# Patient Record
Sex: FEMALE | Race: WHITE | NOT HISPANIC OR LATINO | Employment: OTHER | URBAN - METROPOLITAN AREA
[De-identification: names, ages, dates, MRNs, and addresses within clinical notes are randomized per-mention and may not be internally consistent; named-entity substitution may affect disease eponyms.]

---

## 2017-12-28 ENCOUNTER — HOSPITAL ENCOUNTER (OUTPATIENT)
Dept: OTHER | Facility: HOSPITAL | Age: 77
Discharge: HOME | End: 2017-12-28
Attending: PSYCHIATRY & NEUROLOGY | Admitting: PSYCHIATRY & NEUROLOGY

## 2017-12-28 LAB
ALBUMIN SERPL-MCNC: 4.1 G/DL (ref 3.5–4.8)
ALBUMIN/GLOB SERPL: 1.8 {RATIO} (ref 1–1.7)
ALP SERPL-CCNC: 27 IU/L (ref 32–91)
ALT SERPL-CCNC: 13 IU/L (ref 14–54)
ANION GAP SERPL CALC-SCNC: 9.3 MMOL/L (ref 10–20)
AST SERPL-CCNC: 24 IU/L (ref 15–41)
BASOPHILS # BLD AUTO: 0 10*3/UL (ref 0–0.2)
BASOPHILS NFR BLD AUTO: 1 % (ref 0–2)
BILIRUB SERPL-MCNC: 0.4 MG/DL (ref 0.3–1.2)
BUN SERPL-MCNC: 16 MG/DL (ref 8–20)
BUN/CREAT SERPL: 14.5 (ref 5.4–26.2)
CALCIUM SERPL-MCNC: 9.2 MG/DL (ref 8.9–10.3)
CHLORIDE SERPL-SCNC: 105 MMOL/L (ref 101–111)
CONV CO2: 28 MMOL/L (ref 22–32)
CONV TOTAL PROTEIN: 6.4 G/DL (ref 6.1–7.9)
CREAT UR-MCNC: 1.1 MG/DL (ref 0.4–1)
DIFFERENTIAL METHOD BLD: (no result)
EOSINOPHIL # BLD AUTO: 0.2 10*3/UL (ref 0–0.3)
EOSINOPHIL # BLD AUTO: 5 % (ref 0–3)
ERYTHROCYTE [DISTWIDTH] IN BLOOD BY AUTOMATED COUNT: 13.3 % (ref 11.5–14.5)
ERYTHROCYTE [SEDIMENTATION RATE] IN BLOOD BY WESTERGREN METHOD: 16 MM/HR (ref 0–30)
GLOBULIN UR ELPH-MCNC: 2.3 G/DL (ref 2.5–3.8)
GLUCOSE SERPL-MCNC: 82 MG/DL (ref 65–99)
HCT VFR BLD AUTO: 41.5 % (ref 35–49)
HGB BLD-MCNC: 13.8 G/DL (ref 12–15)
LYMPHOCYTES # BLD AUTO: 1.4 10*3/UL (ref 0.8–4.8)
LYMPHOCYTES NFR BLD AUTO: 27 % (ref 18–42)
MAGNESIUM SERPL-MCNC: 2.2 MG/DL (ref 1.8–2.5)
MCH RBC QN AUTO: 32.5 PG (ref 26–32)
MCHC RBC AUTO-ENTMCNC: 33.2 G/DL (ref 32–36)
MCV RBC AUTO: 97.9 FL (ref 80–94)
MONOCYTES # BLD AUTO: 0.7 10*3/UL (ref 0.1–1.3)
MONOCYTES NFR BLD AUTO: 14 % (ref 2–11)
NEUTROPHILS # BLD AUTO: 2.8 10*3/UL (ref 2.3–8.6)
NEUTROPHILS NFR BLD AUTO: 53 % (ref 50–75)
NRBC BLD AUTO-RTO: 0 /100{WBCS}
NRBC/RBC NFR BLD MANUAL: 0 10*3/UL
PLATELET # BLD AUTO: 399 10*3/UL (ref 150–450)
PMV BLD AUTO: 8.6 FL (ref 7.4–10.4)
POTASSIUM SERPL-SCNC: 4.3 MMOL/L (ref 3.6–5.1)
RBC # BLD AUTO: 4.24 10*6/UL (ref 4–5.4)
SODIUM SERPL-SCNC: 138 MMOL/L (ref 136–144)
WBC # BLD AUTO: 5.1 10*3/UL (ref 4.5–11.5)

## 2017-12-29 LAB
ANA SER QL IA: NORMAL
CHROMATIN AB SERPL-ACNC: <20 [IU]/ML (ref 0–20)

## 2018-01-09 ENCOUNTER — HOSPITAL ENCOUNTER (OUTPATIENT)
Dept: LAB | Facility: HOSPITAL | Age: 78
Discharge: HOME | End: 2018-01-09
Attending: PSYCHIATRY & NEUROLOGY | Admitting: PSYCHIATRY & NEUROLOGY

## 2018-03-06 ENCOUNTER — OFFICE (AMBULATORY)
Dept: URBAN - METROPOLITAN AREA PATHOLOGY 4 | Facility: PATHOLOGY | Age: 78
End: 2018-03-06

## 2018-03-06 ENCOUNTER — HOSPITAL ENCOUNTER (OUTPATIENT)
Dept: OTHER | Facility: HOSPITAL | Age: 78
Setting detail: SPECIMEN
Discharge: HOME | End: 2018-03-06
Attending: INTERNAL MEDICINE | Admitting: INTERNAL MEDICINE

## 2018-03-06 ENCOUNTER — ON CAMPUS - OUTPATIENT (AMBULATORY)
Dept: URBAN - METROPOLITAN AREA HOSPITAL 2 | Facility: HOSPITAL | Age: 78
End: 2018-03-06

## 2018-03-06 VITALS
DIASTOLIC BLOOD PRESSURE: 63 MMHG | RESPIRATION RATE: 15 BRPM | WEIGHT: 127.6 LBS | RESPIRATION RATE: 20 BRPM | TEMPERATURE: 96.8 F | OXYGEN SATURATION: 99 % | HEART RATE: 61 BPM | DIASTOLIC BLOOD PRESSURE: 62 MMHG | SYSTOLIC BLOOD PRESSURE: 136 MMHG | HEIGHT: 65 IN | HEART RATE: 65 BPM | DIASTOLIC BLOOD PRESSURE: 51 MMHG | HEART RATE: 68 BPM | SYSTOLIC BLOOD PRESSURE: 108 MMHG | SYSTOLIC BLOOD PRESSURE: 106 MMHG | DIASTOLIC BLOOD PRESSURE: 56 MMHG | DIASTOLIC BLOOD PRESSURE: 61 MMHG | OXYGEN SATURATION: 98 % | SYSTOLIC BLOOD PRESSURE: 117 MMHG | OXYGEN SATURATION: 95 % | OXYGEN SATURATION: 100 % | RESPIRATION RATE: 16 BRPM | DIASTOLIC BLOOD PRESSURE: 71 MMHG | HEART RATE: 63 BPM | HEART RATE: 62 BPM | HEART RATE: 55 BPM | SYSTOLIC BLOOD PRESSURE: 105 MMHG | DIASTOLIC BLOOD PRESSURE: 47 MMHG | HEART RATE: 57 BPM | SYSTOLIC BLOOD PRESSURE: 134 MMHG | HEART RATE: 60 BPM | SYSTOLIC BLOOD PRESSURE: 137 MMHG | SYSTOLIC BLOOD PRESSURE: 96 MMHG | DIASTOLIC BLOOD PRESSURE: 60 MMHG | RESPIRATION RATE: 18 BRPM | HEART RATE: 56 BPM

## 2018-03-06 DIAGNOSIS — K21.0 GASTRO-ESOPHAGEAL REFLUX DISEASE WITH ESOPHAGITIS: ICD-10-CM

## 2018-03-06 DIAGNOSIS — D12.5 BENIGN NEOPLASM OF SIGMOID COLON: ICD-10-CM

## 2018-03-06 DIAGNOSIS — D13.0 BENIGN NEOPLASM OF ESOPHAGUS: ICD-10-CM

## 2018-03-06 DIAGNOSIS — Z80.0 FAMILY HISTORY OF MALIGNANT NEOPLASM OF DIGESTIVE ORGANS: ICD-10-CM

## 2018-03-06 DIAGNOSIS — R13.10 DYSPHAGIA, UNSPECIFIED: ICD-10-CM

## 2018-03-06 DIAGNOSIS — K57.30 DIVERTICULOSIS OF LARGE INTESTINE WITHOUT PERFORATION OR ABS: ICD-10-CM

## 2018-03-06 LAB
GI HISTOLOGY: A. UNSPECIFIED: (no result)
GI HISTOLOGY: B. SELECT: (no result)
GI HISTOLOGY: C. UNSPECIFIED: (no result)
GI HISTOLOGY: PDF REPORT: (no result)

## 2018-03-06 PROCEDURE — 88305 TISSUE EXAM BY PATHOLOGIST: CPT | Mod: 26 | Performed by: INTERNAL MEDICINE

## 2018-03-06 PROCEDURE — 43239 EGD BIOPSY SINGLE/MULTIPLE: CPT | Mod: 59 | Performed by: INTERNAL MEDICINE

## 2018-03-06 PROCEDURE — 45380 COLONOSCOPY AND BIOPSY: CPT | Performed by: INTERNAL MEDICINE

## 2018-03-06 PROCEDURE — 43450 DILATE ESOPHAGUS 1/MULT PASS: CPT | Performed by: INTERNAL MEDICINE

## 2018-03-06 RX ADMIN — PROPOFOL: 10 INJECTION, EMULSION INTRAVENOUS at 09:05

## 2018-11-13 ENCOUNTER — HOSPITAL ENCOUNTER (OUTPATIENT)
Dept: PHYSICAL THERAPY | Facility: HOSPITAL | Age: 78
Setting detail: RECURRING SERIES
Discharge: HOME | End: 2018-11-29
Attending: NURSE PRACTITIONER | Admitting: NURSE PRACTITIONER

## 2019-05-20 ENCOUNTER — CONVERSION ENCOUNTER (OUTPATIENT)
Dept: OTHER | Facility: HOSPITAL | Age: 79
End: 2019-05-20

## 2019-06-04 VITALS
BODY MASS INDEX: 20.66 KG/M2 | DIASTOLIC BLOOD PRESSURE: 70 MMHG | WEIGHT: 124 LBS | HEIGHT: 65 IN | SYSTOLIC BLOOD PRESSURE: 137 MMHG | HEART RATE: 71 BPM

## 2019-10-09 RX ORDER — LEVETIRACETAM 750 MG/1
1500 TABLET ORAL 2 TIMES DAILY
Qty: 360 TABLET | Refills: 3 | Status: SHIPPED | OUTPATIENT
Start: 2019-10-09 | End: 2020-06-01

## 2020-02-11 RX ORDER — TRAZODONE HYDROCHLORIDE 50 MG/1
TABLET ORAL
Qty: 90 TABLET | Refills: 1 | Status: SHIPPED | OUTPATIENT
Start: 2020-02-11 | End: 2020-05-20 | Stop reason: SDUPTHER

## 2020-05-20 ENCOUNTER — OFFICE VISIT (OUTPATIENT)
Dept: NEUROLOGY | Facility: CLINIC | Age: 80
End: 2020-05-20

## 2020-05-20 VITALS
BODY MASS INDEX: 21.69 KG/M2 | DIASTOLIC BLOOD PRESSURE: 74 MMHG | TEMPERATURE: 97.1 F | SYSTOLIC BLOOD PRESSURE: 119 MMHG | HEART RATE: 66 BPM | WEIGHT: 122.4 LBS | HEIGHT: 63 IN

## 2020-05-20 DIAGNOSIS — G47.01 INSOMNIA DUE TO MEDICAL CONDITION: ICD-10-CM

## 2020-05-20 DIAGNOSIS — R56.9 SEIZURES (HCC): Primary | ICD-10-CM

## 2020-05-20 PROBLEM — T85.43XA RUPTURED SILICONE BREAST IMPLANT: Status: ACTIVE | Noted: 2018-08-09

## 2020-05-20 PROBLEM — Z98.86: Status: ACTIVE | Noted: 2018-08-09

## 2020-05-20 PROBLEM — F32.A DEPRESSION: Status: ACTIVE | Noted: 2017-12-20

## 2020-05-20 PROBLEM — M25.512 CHRONIC LEFT SHOULDER PAIN: Status: ACTIVE | Noted: 2018-11-06

## 2020-05-20 PROBLEM — I10 HYPERTENSION: Status: ACTIVE | Noted: 2019-07-08

## 2020-05-20 PROBLEM — Z90.13 HISTORY OF BILATERAL MASTECTOMY: Status: ACTIVE | Noted: 2018-08-09

## 2020-05-20 PROBLEM — M81.0 OSTEOPOROSIS: Status: ACTIVE | Noted: 2017-12-20

## 2020-05-20 PROBLEM — D47.3 ESSENTIAL THROMBOCYTHEMIA (HCC): Status: ACTIVE | Noted: 2019-07-29

## 2020-05-20 PROBLEM — G89.29 CHRONIC LEFT SHOULDER PAIN: Status: ACTIVE | Noted: 2018-11-06

## 2020-05-20 PROCEDURE — 99213 OFFICE O/P EST LOW 20 MIN: CPT | Performed by: PSYCHIATRY & NEUROLOGY

## 2020-05-20 RX ORDER — PRAVASTATIN SODIUM 80 MG/1
TABLET ORAL
COMMUNITY
Start: 2020-04-28

## 2020-05-20 RX ORDER — ESCITALOPRAM OXALATE 10 MG/1
TABLET ORAL
COMMUNITY
Start: 2020-04-28

## 2020-05-20 RX ORDER — LANOLIN ALCOHOL/MO/W.PET/CERES
CREAM (GRAM) TOPICAL EVERY 24 HOURS
COMMUNITY
Start: 2018-05-24

## 2020-05-20 RX ORDER — HYDROXYUREA 500 MG/1
CAPSULE ORAL
COMMUNITY
Start: 2020-04-10

## 2020-05-20 RX ORDER — TRAZODONE HYDROCHLORIDE 50 MG/1
50 TABLET ORAL
Qty: 90 TABLET | Refills: 3 | Status: SHIPPED | OUTPATIENT
Start: 2020-05-20 | End: 2021-03-31

## 2020-05-20 RX ORDER — PHENOL 1.4 %
AEROSOL, SPRAY (ML) MUCOUS MEMBRANE EVERY 24 HOURS
COMMUNITY
Start: 2017-12-20

## 2020-05-20 RX ORDER — LEVOTHYROXINE SODIUM 0.07 MG/1
TABLET ORAL
COMMUNITY
Start: 2020-04-28

## 2020-05-20 NOTE — PROGRESS NOTES
Subjective: Seizure disorder    Patient ID: Michelle Mcelroy is a 79 y.o. female.    History of Present Illness, yearly f/u    Seizure disorder,   Patient is taking Kepra 750 mg 2 bid,     she is doing well with the medication.had seizures on lower dose.     No seizures and no side effects.    etiology: idiopathic  semiology: movements of hands and shoulders, no complete LOC  onset: age 52, off medicaiton for 10 yers sz free, sz about 4 yrs, no seizurs since back on medication  Medication: Patient is taking Kepra 750 mg 2 bid, she is doing well with the medication.had seizures on lower dose.      Insomnia, sleep onset, doing fair with the trazodone    The following portions of the patient's history were reviewed and updated as appropriate: allergies, current medications, past family history, past medical history, past social history, past surgical history and problem list.    History reviewed. No pertinent family history.    History reviewed. No pertinent past medical history.    Social History     Socioeconomic History   • Marital status: Single     Spouse name: Not on file   • Number of children: Not on file   • Years of education: Not on file   • Highest education level: Not on file   Tobacco Use   • Smoking status: Never Smoker   • Smokeless tobacco: Never Used   Substance and Sexual Activity   • Alcohol use: Never     Frequency: Never   • Drug use: Never   • Sexual activity: Not Currently     Partners: Male         Current Outpatient Medications:   •  aspirin 81 MG tablet, Daily., Disp: , Rfl:   •  calcium carbonate (OS-IGNACIO) 600 MG tablet, Daily., Disp: , Rfl:   •  diclofenac (VOLTAREN) 1 % gel gel, , Disp: , Rfl:   •  escitalopram (LEXAPRO) 10 MG tablet, , Disp: , Rfl:   •  hydroxyurea (HYDREA) 500 MG capsule, , Disp: , Rfl:   •  levETIRAcetam (KEPPRA) 750 MG tablet, Take 2 tablets by mouth 2 (Two) Times a Day., Disp: 360 tablet, Rfl: 3  •  levothyroxine (SYNTHROID, LEVOTHROID) 75 MCG tablet, , Disp: , Rfl:   •   pravastatin (PRAVACHOL) 80 MG tablet, , Disp: , Rfl:   •  traZODone (DESYREL) 50 MG tablet, Take 1 tablet by mouth every night at bedtime., Disp: 90 tablet, Rfl: 3  •  vitamin B-12 (CYANOCOBALAMIN) 1000 MCG tablet, Daily., Disp: , Rfl:     Review of Systems   Constitutional: Negative for appetite change and fatigue.   HENT: Negative for nosebleeds and postnasal drip.    Eyes: Negative for itching and visual disturbance.   Respiratory: Negative for cough and shortness of breath.    Cardiovascular: Negative for chest pain and palpitations.   Gastrointestinal: Positive for diarrhea. Negative for constipation.   Endocrine: Negative for cold intolerance and heat intolerance.   Genitourinary: Negative for difficulty urinating and frequency.   Musculoskeletal: Positive for back pain. Negative for neck pain.   Allergic/Immunologic: Negative for environmental allergies.   Neurological: Positive for seizures. Negative for dizziness, tremors, syncope, facial asymmetry, speech difficulty, weakness, light-headedness, numbness and headaches.   Psychiatric/Behavioral: Negative for agitation and confusion.          I have reviewed ROS completed by medical assistant.     Objective:    Neurologic Exam    Physical Exam   Constitutional: She appears well-developed and well-nourished.   Neck: Normal range of motion.   Psychiatric: She has a normal mood and affect.   Vitals reviewed.      Assessment/Plan:    Michelle was seen today for seizures.    Diagnoses and all orders for this visit:    Seizures (CMS/HCC)    Insomnia due to medical condition    Other orders  -     traZODone (DESYREL) 50 MG tablet; Take 1 tablet by mouth every night at bedtime.        Continue keppra 3000mg and trazodone 50 mg per day    Fu in one year    This document has been electronically signed by Joseph Seipel, MD on May 20, 2020 11:03

## 2020-06-01 RX ORDER — LEVETIRACETAM 750 MG/1
TABLET ORAL
Qty: 360 TABLET | Refills: 3 | Status: SHIPPED | OUTPATIENT
Start: 2020-06-01 | End: 2021-03-18 | Stop reason: SDUPTHER

## 2020-12-18 ENCOUNTER — OFFICE (AMBULATORY)
Dept: URBAN - METROPOLITAN AREA PATHOLOGY 4 | Facility: PATHOLOGY | Age: 80
End: 2020-12-18

## 2020-12-18 ENCOUNTER — ON CAMPUS - OUTPATIENT (AMBULATORY)
Dept: URBAN - METROPOLITAN AREA HOSPITAL 2 | Facility: HOSPITAL | Age: 80
End: 2020-12-18

## 2020-12-18 VITALS
DIASTOLIC BLOOD PRESSURE: 66 MMHG | HEART RATE: 67 BPM | SYSTOLIC BLOOD PRESSURE: 135 MMHG | HEART RATE: 65 BPM | DIASTOLIC BLOOD PRESSURE: 50 MMHG | SYSTOLIC BLOOD PRESSURE: 110 MMHG | DIASTOLIC BLOOD PRESSURE: 41 MMHG | SYSTOLIC BLOOD PRESSURE: 105 MMHG | DIASTOLIC BLOOD PRESSURE: 74 MMHG | SYSTOLIC BLOOD PRESSURE: 134 MMHG | SYSTOLIC BLOOD PRESSURE: 116 MMHG | DIASTOLIC BLOOD PRESSURE: 67 MMHG | HEIGHT: 65 IN | HEART RATE: 64 BPM | OXYGEN SATURATION: 98 % | OXYGEN SATURATION: 100 % | HEART RATE: 61 BPM | SYSTOLIC BLOOD PRESSURE: 133 MMHG | WEIGHT: 119 LBS | HEART RATE: 66 BPM | SYSTOLIC BLOOD PRESSURE: 129 MMHG | RESPIRATION RATE: 18 BRPM | HEART RATE: 55 BPM | DIASTOLIC BLOOD PRESSURE: 47 MMHG | DIASTOLIC BLOOD PRESSURE: 69 MMHG | RESPIRATION RATE: 16 BRPM | HEART RATE: 62 BPM | SYSTOLIC BLOOD PRESSURE: 148 MMHG | HEART RATE: 58 BPM | TEMPERATURE: 97.3 F | DIASTOLIC BLOOD PRESSURE: 55 MMHG | DIASTOLIC BLOOD PRESSURE: 98 MMHG

## 2020-12-18 DIAGNOSIS — R19.7 DIARRHEA, UNSPECIFIED: ICD-10-CM

## 2020-12-18 DIAGNOSIS — D12.3 BENIGN NEOPLASM OF TRANSVERSE COLON: ICD-10-CM

## 2020-12-18 DIAGNOSIS — K57.30 DIVERTICULOSIS OF LARGE INTESTINE WITHOUT PERFORATION OR ABS: ICD-10-CM

## 2020-12-18 DIAGNOSIS — R19.4 CHANGE IN BOWEL HABIT: ICD-10-CM

## 2020-12-18 DIAGNOSIS — R13.10 DYSPHAGIA, UNSPECIFIED: ICD-10-CM

## 2020-12-18 PROBLEM — K63.5 POLYP OF COLON: Status: ACTIVE | Noted: 2020-12-18

## 2020-12-18 LAB
GI HISTOLOGY: A. UNSPECIFIED: (no result)
GI HISTOLOGY: B. UNSPECIFIED: (no result)
GI HISTOLOGY: C. UNSPECIFIED: (no result)
GI HISTOLOGY: PDF REPORT: (no result)

## 2020-12-18 PROCEDURE — 45380 COLONOSCOPY AND BIOPSY: CPT | Performed by: INTERNAL MEDICINE

## 2020-12-18 PROCEDURE — 43239 EGD BIOPSY SINGLE/MULTIPLE: CPT | Performed by: INTERNAL MEDICINE

## 2020-12-18 PROCEDURE — 88305 TISSUE EXAM BY PATHOLOGIST: CPT | Mod: 26 | Performed by: INTERNAL MEDICINE

## 2020-12-18 PROCEDURE — 43450 DILATE ESOPHAGUS 1/MULT PASS: CPT | Performed by: INTERNAL MEDICINE

## 2021-02-25 ENCOUNTER — TELEPHONE (OUTPATIENT)
Dept: NEUROLOGY | Facility: CLINIC | Age: 81
End: 2021-02-25

## 2021-02-25 RX ORDER — TRIAMCINOLONE ACETONIDE 1 MG/G
CREAM TOPICAL
COMMUNITY
Start: 2021-02-23

## 2021-02-25 RX ORDER — ZOLPIDEM TARTRATE 5 MG/1
TABLET ORAL
COMMUNITY
Start: 2021-02-03

## 2021-02-25 NOTE — TELEPHONE ENCOUNTER
Caller: PT  Relationship to Patient: SELF  Phone Number: [phone]  Reason for Call: PT. HAD 2ND COVID SHOT (PFIZER) YESTERDAY; PT SUFFERED SEIZURE TODAY, THE FIRST IN APPROXIMATELY 4 YEARS.    PATIENT ORIGINALLY HAD AN APPOINTMENT SCHEDULED FOR 5/24/2021; I WAS ABLE TO RE-SCHEDULE FOR 4/5/2021.    PLEASE CALL AND ADVISE.

## 2021-02-25 NOTE — TELEPHONE ENCOUNTER
Was this a seizure like reported before with movents of UE and shoulders but no complete LOC?    continue taking the Keppra the same dose

## 2021-03-15 ENCOUNTER — TELEPHONE (OUTPATIENT)
Dept: NEUROLOGY | Facility: CLINIC | Age: 81
End: 2021-03-15

## 2021-03-15 NOTE — TELEPHONE ENCOUNTER
Provider: DR SEIPEL    Caller: ELISA SO    Relationship to Patient: SELF    Pharmacy: [facility]     Phone Number: [phone]    Reason for Call: THE PT STATED SHE HAD A MAJOR SEIZURE ON Saturday AND SHE JUST WANTED TO LET DR SEIPEL KNOW. SHE STATED SHE IS FEELING ALRIGHT RIGHT NOW. THE PT STATED SHE WAS JUST TIRED AFTER THE SEIZURE BUT HAD NO OTHER SIDE EFFECTS.    When was the patient last seen: 5/20/20    PLEASE GIVE HER A CALL TO DISCUSS THIS WITH HER.

## 2021-03-18 RX ORDER — LEVETIRACETAM 750 MG/1
TABLET ORAL
Qty: 450 TABLET | Refills: 3 | Status: SHIPPED | OUTPATIENT
Start: 2021-03-18 | End: 2021-04-05 | Stop reason: SDUPTHER

## 2021-03-18 NOTE — TELEPHONE ENCOUNTER
She called approximately a month ago stating she had seizure activity getting her seizure Covid vaccine dose.    I presume this is a note about a second seizure.?    Recommend that she increase the Keppra to a total of 5/day she currently is taking 750 mg tablet 2 tablets twice a day so increase this to 2 tablets in the morning and 3 in the evening

## 2021-03-31 RX ORDER — TRAZODONE HYDROCHLORIDE 50 MG/1
50 TABLET ORAL
Qty: 90 TABLET | Refills: 3 | Status: SHIPPED | OUTPATIENT
Start: 2021-03-31 | End: 2022-04-01

## 2021-04-05 ENCOUNTER — OFFICE VISIT (OUTPATIENT)
Dept: NEUROLOGY | Facility: CLINIC | Age: 81
End: 2021-04-05

## 2021-04-05 VITALS
DIASTOLIC BLOOD PRESSURE: 72 MMHG | WEIGHT: 127 LBS | HEART RATE: 74 BPM | TEMPERATURE: 96.9 F | RESPIRATION RATE: 16 BRPM | SYSTOLIC BLOOD PRESSURE: 128 MMHG | BODY MASS INDEX: 22.5 KG/M2 | HEIGHT: 63 IN

## 2021-04-05 DIAGNOSIS — R56.9 SEIZURES (HCC): Primary | ICD-10-CM

## 2021-04-05 DIAGNOSIS — G47.01 INSOMNIA DUE TO MEDICAL CONDITION: ICD-10-CM

## 2021-04-05 PROCEDURE — 99214 OFFICE O/P EST MOD 30 MIN: CPT | Performed by: PSYCHIATRY & NEUROLOGY

## 2021-04-05 RX ORDER — LEVETIRACETAM 750 MG/1
TABLET ORAL
Qty: 360 TABLET | Refills: 3 | Status: SHIPPED | OUTPATIENT
Start: 2021-04-05 | End: 2022-04-05 | Stop reason: SDUPTHER

## 2021-04-16 ENCOUNTER — TELEPHONE (OUTPATIENT)
Dept: NEUROLOGY | Facility: CLINIC | Age: 81
End: 2021-04-16

## 2021-04-16 NOTE — TELEPHONE ENCOUNTER
Caller: Michelle Mcelroy    Relationship: Self    Best call back number: [phone]    What medications are you currently taking:   Current Outpatient Medications on File Prior to Visit   Medication Sig Dispense Refill   • aspirin 81 MG tablet Daily.     • calcium carbonate (OS-IGNACIO) 600 MG tablet Daily.     • diclofenac (VOLTAREN) 1 % gel gel      • escitalopram (LEXAPRO) 10 MG tablet      • hydroxyurea (HYDREA) 500 MG capsule      • levETIRAcetam (KEPPRA) 750 MG tablet Two tablets bid 360 tablet 3   • levothyroxine (SYNTHROID, LEVOTHROID) 75 MCG tablet      • pravastatin (PRAVACHOL) 80 MG tablet      • traZODone (DESYREL) 50 MG tablet TAKE 1 TABLET BY MOUTH EVERY NIGHT AT BEDTIME. 90 tablet 3   • triamcinolone (KENALOG) 0.1 % cream      • vitamin B-12 (CYANOCOBALAMIN) 1000 MCG tablet Daily.     • zolpidem (AMBIEN) 5 MG tablet        No current facility-administered medications on file prior to visit.        When did you start taking these medications: SEVERAL YEARS    Which medication are you concerned about:   levETIRAcetam (KEPPRA) 750 MG tablet    Who prescribed you this medication: DR.SEIPEL    What are your concerns:   PATIENT STATED THAT DR.SEIPEL TOLD HER TO START TAKING AN EXTRA PILL AT NIGHT, AND SHE DOES NOT THINK SHE CAN TOLERATE IT SO SHE WANTS TO KNOW IF SHE CAN DIVIDE THAT EXTRA PILL HALF AT NIGHT, AND HALF IN THE MORNING.   PLEASE ADVISE.

## 2021-04-30 NOTE — TELEPHONE ENCOUNTER
Caller: Michelle Mcelroy    Relationship: Self    Best call back number: [phone]    What medications are you currently taking:   Current Outpatient Medications on File Prior to Visit   Medication Sig Dispense Refill   • aspirin 81 MG tablet Daily.     • calcium carbonate (OS-IGNACIO) 600 MG tablet Daily.     • diclofenac (VOLTAREN) 1 % gel gel      • escitalopram (LEXAPRO) 10 MG tablet      • hydroxyurea (HYDREA) 500 MG capsule      • levETIRAcetam (KEPPRA) 750 MG tablet Two tablets bid 360 tablet 3   • levothyroxine (SYNTHROID, LEVOTHROID) 75 MCG tablet      • pravastatin (PRAVACHOL) 80 MG tablet      • traZODone (DESYREL) 50 MG tablet TAKE 1 TABLET BY MOUTH EVERY NIGHT AT BEDTIME. 90 tablet 3   • triamcinolone (KENALOG) 0.1 % cream      • vitamin B-12 (CYANOCOBALAMIN) 1000 MCG tablet Daily.     • zolpidem (AMBIEN) 5 MG tablet        No current facility-administered medications on file prior to visit.        Which medication are you concerned about:   levETIRAcetam (KEPPRA) 750 MG tablet    Who prescribed you this medication:   DR.SEIPEL    What are your concerns:   THE PATIENT STATES SHE STARTED TAKING THE OTHER HALF AT PM, AND NOW SHE SAID THE EXTRA MEDICATION IS MAKING HER EXTRA DIZZY.SHE NO LONGER WANTS TO TAKE THIS MEDICATION AND WANTS TO RUN IT BY DR.SEIPEL      SHE WANTED ME TO ADD THAT SHE HAD A SEIZURE LAST Saturday.     PLEASE ADVISE.

## 2021-11-02 ENCOUNTER — TELEPHONE (OUTPATIENT)
Dept: NEUROLOGY | Facility: CLINIC | Age: 81
End: 2021-11-02

## 2021-11-02 NOTE — TELEPHONE ENCOUNTER
Caller: Michelle Mcelroy    Relationship: Self    Best call back number: [phone]    Who are you requesting to speak with (clinical staff, provider,  specific staff member):   DR SEIPEL    What was the call regarding:   PT IS CALLING TO SEE IF SHE SHOULD GET THE BOOSTER PFIZER VACCINE.    LAST FEB WHEN PT GOT HER SECOND PFIZER VACCINE SHOT SHE HAD ABOUT 4 SEIZURES.    Do you require a callback: YES, PLEASE LET PT KNOW IF OK TO GET THE BOOSTER.

## 2021-11-03 NOTE — TELEPHONE ENCOUNTER
Were these seizures after the vaccine grand mal generalized tonic-clonic seizures are were they brief partial seizures    If they were brief partial seizures of no great consequence go ahead and get the vaccine if they were generalized tonic-clonic grand mal seizures which can be life-threatening I would not get vaccine

## 2022-01-21 ENCOUNTER — TELEPHONE (OUTPATIENT)
Dept: NEUROLOGY | Facility: CLINIC | Age: 82
End: 2022-01-21

## 2022-01-21 DIAGNOSIS — R56.9 SEIZURES: ICD-10-CM

## 2022-01-21 NOTE — TELEPHONE ENCOUNTER
I will send for a keppra blood level,   Consider increasing dose rather than adding 2nd drug but would like to check level and kidney functions first    Confirm with her how much keppra she is taking

## 2022-01-21 NOTE — TELEPHONE ENCOUNTER
Provider:  DR SEIPEL   Caller:  ELISA SO   Relationship to Patient:  PT   Pharmacy: [facility]   Phone Number:[phone]  Reason for Call:  PT CALLING IN STATES SHE HAD 2 SEIZURES THIS WEEK ONE YESTERDAY MORNING THE OTHER A FEW DAYS AGO . SHE WAS AT [employer] YESTERDAY   AND  SHE SAYS SHE COULD FEEL IT COMING ON .  THEN  OTHER DAY  SHE WAS  AT HOME SITTING IN HER CHAIR STATES HER SON HAD COME OVER AND SAW HER IN A SEIZURE .   PT STATES SHE HAS BEEN TAKING MEDICATION AS PRESCRIBED  IS SCHEDULED FOR F/U ON 04/05/2022  IS CONCERNED  STATES SHE USED TO HAVE SEIZURES FREQUENTLY BUT HAS NOT HAD ANY FOR AWHILE NOTICED THAT SINCE HAVING 2ND  SHOT FOR COVID BACK IN FEB  AND THEN BOOSTER  IN AUG  HAS BEEN HAVING MORE FREQUENT SEIZURES  STATES THEY ARE WORSE THAN BEFORE   When was the patient last seen: 04/05/2021

## 2022-01-26 NOTE — TELEPHONE ENCOUNTER
PATIENT WAS INFORMED SHE WILL GET LABS DONE, SHE IS UNABLE TO INCREASE THE KEPPRA SHE IS CURENTLY TAKING KEPPRA 750 MG BID..

## 2022-01-28 ENCOUNTER — LAB (OUTPATIENT)
Dept: LAB | Facility: HOSPITAL | Age: 82
End: 2022-01-28

## 2022-01-28 DIAGNOSIS — R56.9 SEIZURES: ICD-10-CM

## 2022-01-28 LAB
ANION GAP SERPL CALCULATED.3IONS-SCNC: 11.7 MMOL/L (ref 5–15)
BUN SERPL-MCNC: 22 MG/DL (ref 8–23)
BUN/CREAT SERPL: 16.5 (ref 7–25)
CALCIUM SPEC-SCNC: 9.6 MG/DL (ref 8.6–10.5)
CHLORIDE SERPL-SCNC: 102 MMOL/L (ref 98–107)
CO2 SERPL-SCNC: 26.3 MMOL/L (ref 22–29)
CREAT SERPL-MCNC: 1.33 MG/DL (ref 0.57–1)
GFR SERPL CREATININE-BSD FRML MDRD: 38 ML/MIN/1.73
GLUCOSE SERPL-MCNC: 70 MG/DL (ref 65–99)
POTASSIUM SERPL-SCNC: 5.2 MMOL/L (ref 3.5–5.2)
SODIUM SERPL-SCNC: 140 MMOL/L (ref 136–145)

## 2022-01-28 PROCEDURE — 36415 COLL VENOUS BLD VENIPUNCTURE: CPT

## 2022-01-28 PROCEDURE — 80177 DRUG SCRN QUAN LEVETIRACETAM: CPT

## 2022-01-28 PROCEDURE — 80048 BASIC METABOLIC PNL TOTAL CA: CPT

## 2022-01-31 LAB — LEVETIRACETAM SERPL-MCNC: 87.5 UG/ML (ref 10–40)

## 2022-01-31 RX ORDER — ONDANSETRON 4 MG/1
TABLET, FILM COATED ORAL
COMMUNITY
Start: 2021-10-28

## 2022-02-07 ENCOUNTER — TELEPHONE (OUTPATIENT)
Dept: NEUROLOGY | Facility: CLINIC | Age: 82
End: 2022-02-07

## 2022-02-07 RX ORDER — LEVETIRACETAM 500 MG/1
500 TABLET ORAL 2 TIMES DAILY
Qty: 60 TABLET | Refills: 5 | Status: SHIPPED | OUTPATIENT
Start: 2022-02-07 | End: 2022-04-05 | Stop reason: SDUPTHER

## 2022-02-07 NOTE — TELEPHONE ENCOUNTER
Caller: Michelle Mcelroy    Relationship: Self    Best call back number:[phone]    What is the best time to reach you:  ANY     Who are you requesting to speak with (clinical staff, provider,  specific staff member): SUNIL     Do you know the name of the person who called: SUNIL     What was the call regarding:  LAB RESULTS  HAD LEFT MESSAGE FRI TO CALL BACK BUT OFFICE CLOSED EARLY     Do you require a callback: YES

## 2022-02-24 ENCOUNTER — TELEPHONE (OUTPATIENT)
Dept: NEUROLOGY | Facility: CLINIC | Age: 82
End: 2022-02-24

## 2022-02-24 DIAGNOSIS — R56.9 SEIZURES: Primary | ICD-10-CM

## 2022-03-01 NOTE — TELEPHONE ENCOUNTER
Will order an eeg    Call pt and ask her what kind of seizure spell she is having.    We might need to add a second seizure medication     She can go back on the keppra 750mg two tab bid if she feels she was doing better on the higher dose

## 2022-03-02 NOTE — TELEPHONE ENCOUNTER
PATIENT STATES SHE IS NOT HAVING A GRAND-MAL SEIZURES SHE STATES ITS SEIZURES THAT CAUSE HER TO FEEL FUNNY AND SHE START TO JERK AND TENSE UP SHE STATES THESE EPISODES LAST ABOUT 7 MINS...    SHE STATES SHE WANTS TO STAY ON CURRENT DOSE OF MEDICATION 500 MG BID  MG BID SHE STATES SHE FEELS LESS SHAKEY

## 2022-03-15 ENCOUNTER — HOSPITAL ENCOUNTER (OUTPATIENT)
Dept: NEUROLOGY | Facility: HOSPITAL | Age: 82
Discharge: HOME | End: 2022-03-15
Admitting: PSYCHIATRY & NEUROLOGY

## 2022-03-15 DIAGNOSIS — R56.9 SEIZURES: ICD-10-CM

## 2022-03-15 PROCEDURE — 95819 EEG AWAKE AND ASLEEP: CPT

## 2022-03-15 PROCEDURE — 95819 EEG AWAKE AND ASLEEP: CPT | Performed by: PSYCHIATRY & NEUROLOGY

## 2022-04-01 RX ORDER — TRAZODONE HYDROCHLORIDE 50 MG/1
TABLET ORAL
Qty: 90 TABLET | Refills: 3 | Status: SHIPPED | OUTPATIENT
Start: 2022-04-01

## 2022-04-05 ENCOUNTER — OFFICE VISIT (OUTPATIENT)
Dept: NEUROLOGY | Facility: CLINIC | Age: 82
End: 2022-04-05

## 2022-04-05 VITALS
WEIGHT: 123 LBS | SYSTOLIC BLOOD PRESSURE: 134 MMHG | HEART RATE: 67 BPM | BODY MASS INDEX: 21.79 KG/M2 | DIASTOLIC BLOOD PRESSURE: 78 MMHG | TEMPERATURE: 97.3 F | HEIGHT: 63 IN

## 2022-04-05 DIAGNOSIS — R56.9 SEIZURES: Primary | ICD-10-CM

## 2022-04-05 PROBLEM — M19.041 ARTHRITIS OF BOTH HANDS: Status: ACTIVE | Noted: 2020-10-19

## 2022-04-05 PROBLEM — M19.042 ARTHRITIS OF BOTH HANDS: Status: ACTIVE | Noted: 2020-10-19

## 2022-04-05 PROCEDURE — 99214 OFFICE O/P EST MOD 30 MIN: CPT | Performed by: PSYCHIATRY & NEUROLOGY

## 2022-04-05 RX ORDER — LACOSAMIDE 100 MG/1
50 TABLET ORAL EVERY 12 HOURS SCHEDULED
Qty: 180 TABLET | Refills: 1 | Status: SHIPPED | OUTPATIENT
Start: 2022-04-05 | End: 2022-09-26

## 2022-04-05 RX ORDER — LEVETIRACETAM 750 MG/1
750 TABLET ORAL 2 TIMES DAILY
Qty: 180 TABLET | Refills: 3 | Status: SHIPPED | OUTPATIENT
Start: 2022-04-05 | End: 2023-02-01

## 2022-04-05 RX ORDER — LEVETIRACETAM 500 MG/1
500 TABLET ORAL 2 TIMES DAILY
Qty: 180 TABLET | Refills: 3 | Status: SHIPPED | OUTPATIENT
Start: 2022-04-05 | End: 2023-02-01

## 2022-04-26 ENCOUNTER — OFFICE (AMBULATORY)
Dept: URBAN - METROPOLITAN AREA CLINIC 64 | Facility: CLINIC | Age: 82
End: 2022-04-26

## 2022-04-26 ENCOUNTER — TRANSCRIBE ORDERS (OUTPATIENT)
Dept: ADMINISTRATIVE | Facility: HOSPITAL | Age: 82
End: 2022-04-26

## 2022-04-26 VITALS
HEART RATE: 62 BPM | HEIGHT: 65 IN | DIASTOLIC BLOOD PRESSURE: 80 MMHG | WEIGHT: 124 LBS | SYSTOLIC BLOOD PRESSURE: 147 MMHG

## 2022-04-26 DIAGNOSIS — R10.11 ABDOMINAL PAIN, RIGHT UPPER QUADRANT: Primary | ICD-10-CM

## 2022-04-26 DIAGNOSIS — R10.11 RIGHT UPPER QUADRANT PAIN: ICD-10-CM

## 2022-04-26 PROCEDURE — 99213 OFFICE O/P EST LOW 20 MIN: CPT | Performed by: INTERNAL MEDICINE

## 2022-05-06 ENCOUNTER — HOSPITAL ENCOUNTER (OUTPATIENT)
Dept: ULTRASOUND IMAGING | Facility: HOSPITAL | Age: 82
Discharge: HOME | End: 2022-05-06
Admitting: INTERNAL MEDICINE

## 2022-05-06 DIAGNOSIS — R10.11 ABDOMINAL PAIN, RIGHT UPPER QUADRANT: ICD-10-CM

## 2022-05-06 PROCEDURE — 76705 ECHO EXAM OF ABDOMEN: CPT

## 2022-09-22 DIAGNOSIS — R56.9 SEIZURES: ICD-10-CM

## 2022-09-26 RX ORDER — LACOSAMIDE 100 MG/1
TABLET ORAL
Qty: 90 TABLET | Refills: 1 | Status: SHIPPED | OUTPATIENT
Start: 2022-09-26 | End: 2022-10-17 | Stop reason: SDUPTHER

## 2022-09-27 ENCOUNTER — TELEPHONE (OUTPATIENT)
Dept: NEUROLOGY | Facility: CLINIC | Age: 82
End: 2022-09-27

## 2022-09-27 NOTE — TELEPHONE ENCOUNTER
Medication requested (name and dose):     VIMPAT 100 MG    Pharmacy where request should be sent:     [facility] - [facility] [address] - [phone]  - [phone]        Best call back number: [phone]      Does the patient have less than a 3 day supply:  [] Yes  [x] No    Jayjay Bustamante Rep  09/27/22, 09:07 EDT

## 2022-10-03 ENCOUNTER — TELEPHONE (OUTPATIENT)
Dept: NEUROLOGY | Facility: CLINIC | Age: 82
End: 2022-10-03

## 2022-10-03 DIAGNOSIS — R56.9 SEIZURES: Primary | ICD-10-CM

## 2022-10-03 NOTE — TELEPHONE ENCOUNTER
Caller: Michelle Mcelroy    Relationship: Self    Best call back number: [phone]    What was the call regarding: PT CALLED TO CHECK FOR SOONER F/U APPT W/ DR. SEIPEL AS SHE IS NOT SCHEDULED TO BE SEEN UNTIL 10/25/22. I OFFERED PT AN OPEN APPT ON 10/19/22 @ 8:30AM BUT SHE STATED THIS WAS TOO EARLY IN THE MORNING.    PT REPORT SHE HAS HAD 2 SEIZURES IN THE LAST WEEK. ONE LAST Thursday, 9/29/22, AND ANOTHER YESTERDAY, 10/2/22, WHILE AT [employer]. PT HAS NO DETAILS REGARDING SEIZURE LAST Thursday. SEIZURE YESTERDAY LASTED APPROXIMATELY 4-1/2 MINS. PT DENIES ANY INCONTINENCE, TONGUE BITE, OR ANY MISSED MEDICATION DOSAGES.    PT DOES EXPRESS SHE HAS BEEN EXPERIENCING SIDE EFFECTS OF THE VIMPAT/LACOSAMIDE MEDICATION INCLUDING DAILY SHAKINESS AND ABNORMAL HEART BEAT. PT STATES SHE DOES NOT CHECK HER HEART RATE OR BLOOD PRESSURE AT HOME REGULARLY. PT REPORTS FEELING VERY FATIGUED & SLEEPY EVERYDAY. PT NOTICES CHANGES IN HER MOOD, NOTING SHE SEEMS MUCH MORE IRRITABLE THAN NORMAL. PT STATES SHE NOTICED YESTERDAY THAT HER LIPS WERE SWOLLEN AND ARE STILL SWOLLEN TODAY. PT DENIES ANY SIGNS OF AN ALLERGIC REACTION SUCH AS DIFFICULTY BREATHING OR RASH. ATTEMPTED TO WARM-TRANSFER CALL, BUT CHRISTIE ASKED THAT I SEND TELEPHONE ENCOUNTER.    PT IS ASKING IF DR. SEIPEL WOULD ORDER LABS FOR HER TO HAVE COMPLETED IN THESE REGARDS. PT AWARE TO REPORT TO ED IF SHE HAS ANY FURTHER SEIZURES OR WORSENING SIDE EFFECTS OF VIMPAT MEDICATION.    Do you require a callback: YES, PLEASE.    PLEASE REVIEW AND ADVISE.

## 2022-10-10 ENCOUNTER — LAB (OUTPATIENT)
Dept: LAB | Facility: HOSPITAL | Age: 82
End: 2022-10-10

## 2022-10-10 ENCOUNTER — TRANSCRIBE ORDERS (OUTPATIENT)
Dept: LAB | Facility: HOSPITAL | Age: 82
End: 2022-10-10

## 2022-10-10 DIAGNOSIS — R56.9 SEIZURES: ICD-10-CM

## 2022-10-10 LAB
ALBUMIN SERPL-MCNC: 4.7 G/DL (ref 3.5–5.2)
ALBUMIN/GLOB SERPL: 2.2 G/DL
ALP SERPL-CCNC: 34 U/L (ref 39–117)
ALT SERPL W P-5'-P-CCNC: 10 U/L (ref 1–33)
ANION GAP SERPL CALCULATED.3IONS-SCNC: 7 MMOL/L (ref 5–15)
AST SERPL-CCNC: 19 U/L (ref 1–32)
BASOPHILS # BLD AUTO: 0.04 10*3/MM3 (ref 0–0.2)
BASOPHILS NFR BLD AUTO: 0.8 % (ref 0–1.5)
BILIRUB SERPL-MCNC: 0.4 MG/DL (ref 0–1.2)
BUN SERPL-MCNC: 23 MG/DL (ref 8–23)
BUN/CREAT SERPL: 16.8 (ref 7–25)
CALCIUM SPEC-SCNC: 9.8 MG/DL (ref 8.6–10.5)
CHLORIDE SERPL-SCNC: 103 MMOL/L (ref 98–107)
CO2 SERPL-SCNC: 29 MMOL/L (ref 22–29)
CREAT SERPL-MCNC: 1.37 MG/DL (ref 0.57–1)
DEPRECATED RDW RBC AUTO: 43 FL (ref 37–54)
EGFRCR SERPLBLD CKD-EPI 2021: 39.4 ML/MIN/1.73
EOSINOPHIL # BLD AUTO: 0.18 10*3/MM3 (ref 0–0.4)
EOSINOPHIL NFR BLD AUTO: 3.5 % (ref 0.3–6.2)
ERYTHROCYTE [DISTWIDTH] IN BLOOD BY AUTOMATED COUNT: 12.6 % (ref 12.3–15.4)
GLOBULIN UR ELPH-MCNC: 2.1 GM/DL
GLUCOSE SERPL-MCNC: 64 MG/DL (ref 65–99)
HCT VFR BLD AUTO: 38.7 % (ref 34–46.6)
HGB BLD-MCNC: 13.2 G/DL (ref 12–15.9)
IMM GRANULOCYTES # BLD AUTO: 0.01 10*3/MM3 (ref 0–0.05)
IMM GRANULOCYTES NFR BLD AUTO: 0.2 % (ref 0–0.5)
LYMPHOCYTES # BLD AUTO: 1.29 10*3/MM3 (ref 0.7–3.1)
LYMPHOCYTES NFR BLD AUTO: 25.4 % (ref 19.6–45.3)
MCH RBC QN AUTO: 32 PG (ref 26.6–33)
MCHC RBC AUTO-ENTMCNC: 34.1 G/DL (ref 31.5–35.7)
MCV RBC AUTO: 93.7 FL (ref 79–97)
MONOCYTES # BLD AUTO: 0.78 10*3/MM3 (ref 0.1–0.9)
MONOCYTES NFR BLD AUTO: 15.4 % (ref 5–12)
NEUTROPHILS NFR BLD AUTO: 2.78 10*3/MM3 (ref 1.7–7)
NEUTROPHILS NFR BLD AUTO: 54.7 % (ref 42.7–76)
NRBC BLD AUTO-RTO: 0 /100 WBC (ref 0–0.2)
PLATELET # BLD AUTO: 400 10*3/MM3 (ref 140–450)
PMV BLD AUTO: 10.4 FL (ref 6–12)
POTASSIUM SERPL-SCNC: 4.8 MMOL/L (ref 3.5–5.2)
PROT SERPL-MCNC: 6.8 G/DL (ref 6–8.5)
RBC # BLD AUTO: 4.13 10*6/MM3 (ref 3.77–5.28)
SODIUM SERPL-SCNC: 139 MMOL/L (ref 136–145)
WBC NRBC COR # BLD: 5.08 10*3/MM3 (ref 3.4–10.8)

## 2022-10-10 PROCEDURE — 85025 COMPLETE CBC W/AUTO DIFF WBC: CPT

## 2022-10-10 PROCEDURE — 80053 COMPREHEN METABOLIC PANEL: CPT

## 2022-10-10 PROCEDURE — 80235 DRUG ASSAY LACOSAMIDE: CPT

## 2022-10-10 PROCEDURE — 80177 DRUG SCRN QUAN LEVETIRACETAM: CPT

## 2022-10-10 PROCEDURE — 36415 COLL VENOUS BLD VENIPUNCTURE: CPT

## 2022-10-12 LAB — LEVETIRACETAM SERPL-MCNC: 80.9 UG/ML (ref 10–40)

## 2022-10-13 LAB — LACOSAMIDE SERPL-MCNC: 4.9 UG/ML (ref 5–10)

## 2022-10-17 ENCOUNTER — TELEPHONE (OUTPATIENT)
Dept: NEUROLOGY | Facility: CLINIC | Age: 82
End: 2022-10-17

## 2022-10-17 DIAGNOSIS — R56.9 SEIZURES: ICD-10-CM

## 2022-10-17 RX ORDER — LACOSAMIDE 100 MG/1
100 TABLET ORAL EVERY 12 HOURS
Qty: 180 TABLET | Refills: 1 | Status: SHIPPED | OUTPATIENT
Start: 2022-10-17

## 2022-10-17 NOTE — TELEPHONE ENCOUNTER
Pt is on vimpat 50mg bid and keppra 500bid plus 750bid,  keppra level is ok   The vimpat level is low     As she reports continued sezures increase the vimpat to 100mg bid, rx sent to pharmacy  Continue keppra unchanged.

## 2022-10-24 NOTE — PROGRESS NOTES
Chief Complaint  Follow-up (Seizures)    Subjective          Michelle Mcelroy presents to [facility] for Seizures  History of Present Illness    Patient is here to f/u on seizure’s     patient states last seizure was about 1 month ago and lasted about 4 mins  At bible study, staring  Can't respond, but can hear what people say.   Has brief spells of feeling a seizure may be coming on,      patient is currently taking vimpat 50mg bid, script sent for  vimpat  100mg bid, but not filled by pharmacy yet  keppra 500 mg 1 bid, and 750 mg 1 bid and reports no side effects.    keppra level is high  vimpat level on 100mg per day is low     She has had about 2 seizures in past 6 months  Has had seizures after the second and third covid shot    =====history in 2017======================     The pt states she began to have seizures around 1990.  The pt states she had several and was initiated on an anticonvulsant which she states stopped her seizures.  The pt discontinued taking her AED in 1992.  The pt states she had been seizure free until   about 2 years ago, when her 17 year old nephew committed suicide.  Her seizures restarted and she was experiencing about 2 per month.  The pt's last seizure was on 12/16/2017.  The pt was titrated to Keppra 750 mg po BID when her seizures recurred.  The pt   states she is fully aware during her seizure and is able to answer questions while she is having one.  There has never been an associated tongue bite or bowel/bladder incontinence.  The pt states her seizures will begin with feeling lightheaded and shaky.    The pt reports she will lay down and her body will tense up, especially her hands.  The pt will jerk, her eyes will tightly shut and she will clench her teeth.  It will last about 10-15 minutes and afterwards, the pt will feel tired and sleepy.  The pt   states before they begin, she may stutter if she is in conversation.  The pt's  states she has  witnessed these seizures and is able to talk with the pt while it is going on.  The pt states other times, she may feel shaky and not feel right and   this will last about 5-10 minutes.  The pt has been tried on Pb, VPA, PHT, CBZ and LMT in the past.  The pt reports she had reactions to these medications or they have not been helpful.  The pt lives independently.  The pt has chronic insomnia and for   years has been taking Ambien to treat it.  The pt denies any previous history of child abuse.  MRI Brain w/w/o (5/6/2015) revealed microvascular changes.     EEG (1/29/2015) revealed generalized seizures during HV (Dr. Knapp).    ===previous ov 4/5/22 dr seipel======  Patient is here for follow up on seizures  Patient is  taking Kepra 750 mg  bid  Keppra 500mg bid     She had seizure 2 weeks ago, she is having at least 1 a month.  Pt remains aware, but cant respond.  Knows when coming on, arms and legs jerk, duration 5-8 min , tired afterward.     Current Outpatient Medications:   •  aspirin 81 MG tablet, Daily., Disp: , Rfl:   •  calcium carbonate (OS-IGNACIO) 600 MG tablet, Daily., Disp: , Rfl:   •  diclofenac (VOLTAREN) 1 % gel gel, , Disp: , Rfl:   •  escitalopram (LEXAPRO) 10 MG tablet, , Disp: , Rfl:   •  hydroxyurea (HYDREA) 500 MG capsule, , Disp: , Rfl:   •  lacosamide (VIMPAT) 100 MG tablet tablet, Take 1 tablet by mouth Every 12 (Twelve) Hours., Disp: 180 tablet, Rfl: 1  •  levETIRAcetam (KEPPRA) 500 MG tablet, Take 1 tablet by mouth 2 (Two) Times a Day. (take one of the 750 and one of the 500mg keppra twice per day), Disp: 180 tablet, Rfl: 3  •  levETIRAcetam (KEPPRA) 750 MG tablet, Take 1 tablet by mouth 2 (Two) Times a Day. Two tablets bid, Disp: 180 tablet, Rfl: 3  •  levothyroxine (SYNTHROID, LEVOTHROID) 75 MCG tablet, , Disp: , Rfl:   •  ondansetron (ZOFRAN) 4 MG tablet, TAKE 1 TABLET BY MOUTH EVERY 8 (EIGHT) HOURS AS NEEDED FOR NAUSEA FOR UP TO 7 DAYS., Disp: , Rfl:   •  pravastatin (PRAVACHOL) 80 MG  "tablet, , Disp: , Rfl:   •  traZODone (DESYREL) 50 MG tablet, TAKE 1 TABLET EVERY NIGHT AT BEDTIME., Disp: 90 tablet, Rfl: 3  •  triamcinolone (KENALOG) 0.1 % cream, , Disp: , Rfl:   •  vitamin B-12 (CYANOCOBALAMIN) 1000 MCG tablet, Daily., Disp: , Rfl:   •  zolpidem (AMBIEN) 5 MG tablet, , Disp: , Rfl:     Review of Systems   Constitutional: Positive for fatigue.   Endocrine: Positive for cold intolerance.   Neurological: Positive for seizures, light-headedness and headaches.   Psychiatric/Behavioral: Positive for agitation. The patient is nervous/anxious.    All other systems reviewed and are negative.         Objective:    Vital Signs:   /75   Pulse 65   Temp 98.6 °F (37 °C) (Infrared)   Ht 160 cm (63\")   Wt 56.2 kg (124 lb)   BMI 21.97 kg/m²     Physical Exam  Vitals reviewed.   HENT:      Nose: Nose normal.   Pulmonary:      Effort: Pulmonary effort is normal. No respiratory distress.   Neurological:      General: No focal deficit present.      Mental Status: She is alert and oriented to person, place, and time.   Psychiatric:         Mood and Affect: Mood normal.        Result Review :                Neurologic Exam     Mental Status   Oriented to person, place, and time.         Assessment and Plan    Diagnoses and all orders for this visit:    1. Seizures (HCC) (Primary)    2. Insomnia due to medical condition     start the vimpat 150mg bid when receives from pharmacy  Consider decrease in keppra dose if does well with increased vimpat    Follow Up   Return in about 1 year (around 10/25/2023).  Patient was given instructions and counseling regarding her condition or for health maintenance advice. Please see specific information pulled into the AVS if appropriate.     This document has been electronically signed by Joseph Seipel, MD on October 25, 2022 10:43 EDT      "

## 2022-10-25 ENCOUNTER — OFFICE VISIT (OUTPATIENT)
Dept: NEUROLOGY | Facility: CLINIC | Age: 82
End: 2022-10-25

## 2022-10-25 VITALS
TEMPERATURE: 98.6 F | DIASTOLIC BLOOD PRESSURE: 75 MMHG | HEART RATE: 65 BPM | HEIGHT: 63 IN | BODY MASS INDEX: 21.97 KG/M2 | SYSTOLIC BLOOD PRESSURE: 159 MMHG | WEIGHT: 124 LBS

## 2022-10-25 DIAGNOSIS — R56.9 SEIZURES: Primary | ICD-10-CM

## 2022-10-25 DIAGNOSIS — G47.01 INSOMNIA DUE TO MEDICAL CONDITION: ICD-10-CM

## 2022-10-25 PROBLEM — N13.30 HYDRONEPHROSIS: Status: ACTIVE | Noted: 2022-05-10

## 2022-10-25 PROCEDURE — 99214 OFFICE O/P EST MOD 30 MIN: CPT | Performed by: PSYCHIATRY & NEUROLOGY

## 2023-02-01 RX ORDER — LEVETIRACETAM 750 MG/1
TABLET ORAL
Qty: 180 TABLET | Refills: 3 | Status: SHIPPED | OUTPATIENT
Start: 2023-02-01

## 2023-02-01 RX ORDER — LEVETIRACETAM 500 MG/1
TABLET ORAL
Qty: 180 TABLET | Refills: 3 | Status: SHIPPED | OUTPATIENT
Start: 2023-02-01

## 2023-03-15 ENCOUNTER — TELEPHONE (OUTPATIENT)
Dept: NEUROLOGY | Facility: CLINIC | Age: 83
End: 2023-03-15
Payer: MEDICARE

## 2023-03-15 NOTE — TELEPHONE ENCOUNTER
She has been on the vimpat for quite a while , the dose was invreased for 50 to 100 last October    When did she first notice the skin changes? Possibly need to see pcp and or dermatologist for diagnosis of the skin lesions. Can wean off the vimpat if we have to, otherwise I would be inclined to increase the dose to try to control the seizures

## 2023-03-15 NOTE — TELEPHONE ENCOUNTER
Caller: Michelle Mcelroy    Relationship: Self    Best call back number:[phone]    What medications are you currently taking:   Current Outpatient Medications on File Prior to Visit   Medication Sig Dispense Refill   • aspirin 81 MG tablet Daily.     • calcium carbonate (OS-IGNACIO) 600 MG tablet Daily.     • diclofenac (VOLTAREN) 1 % gel gel      • escitalopram (LEXAPRO) 10 MG tablet      • hydroxyurea (HYDREA) 500 MG capsule      • lacosamide (VIMPAT) 100 MG tablet tablet Take 1 tablet by mouth Every 12 (Twelve) Hours. 180 tablet 1   • levETIRAcetam (KEPPRA) 500 MG tablet TAKE 1 TABLET TWICE DAILY ALONG WITH LEVETIRACETAM 750MG 180 tablet 3   • levETIRAcetam (KEPPRA) 750 MG tablet TAKE 1 TABLET TWICE DAILY ALONG WITH LEVETIRACETAM 500MG 180 tablet 3   • levothyroxine (SYNTHROID, LEVOTHROID) 75 MCG tablet      • ondansetron (ZOFRAN) 4 MG tablet TAKE 1 TABLET BY MOUTH EVERY 8 (EIGHT) HOURS AS NEEDED FOR NAUSEA FOR UP TO 7 DAYS.     • pravastatin (PRAVACHOL) 80 MG tablet      • traZODone (DESYREL) 50 MG tablet TAKE 1 TABLET EVERY NIGHT AT BEDTIME. 90 tablet 3   • triamcinolone (KENALOG) 0.1 % cream      • vitamin B-12 (CYANOCOBALAMIN) 1000 MCG tablet Daily.     • zolpidem (AMBIEN) 5 MG tablet        No current facility-administered medications on file prior to visit.          When did you start taking these medications: YEARLY CHECK UP IN October 2022    Which medication are you concerned about: LACOSAMIDE (VIMPAT) 100 MG TABLETS TWICE DAILY     Who prescribed you this medication: SEIPEL    What are your concerns: PATIENT TELEPHONED TO ADVISE SHE HAS HAD 2 SEIZURES LAST WEEK (ONE ON THURS & ONE ON SAT). PATIENT STATES SHE THINKS SHE IS  ALLERGIC TO THE NEW MEDICATION LACOSIMIDE 100 MG TWICE DAILY. THE SIDE EFFECTS APPEAR TO LOOK LIKE HIVES BUT MORE LIKE MEASLES UNDER THE SKIN     How long have you had these concerns: SINCE DOSAGE CHANGE A FEW MONTHS AGO       PLEASE CALL & ADVISE- THANK YOU

## 2023-03-16 NOTE — TELEPHONE ENCOUNTER
Doubt if from the vimpat, but could be  I suggest she see her pcp and or dermatologist to see if katherine think it is a drug rash or something else

## 2025-08-01 ENCOUNTER — APPOINTMENT (OUTPATIENT)
Dept: GENERAL RADIOLOGY | Facility: HOSPITAL | Age: 85
End: 2025-08-01
Payer: MEDICARE

## 2025-08-01 ENCOUNTER — HOSPITAL ENCOUNTER (EMERGENCY)
Facility: HOSPITAL | Age: 85
Discharge: HOME | End: 2025-08-02
Payer: MEDICARE

## 2025-08-01 DIAGNOSIS — R91.1 NODULE OF APEX OF LEFT LUNG: Primary | ICD-10-CM

## 2025-08-01 LAB
BACTERIA UR QL AUTO: NORMAL /HPF
BASOPHILS # BLD AUTO: 0.02 10*3/MM3 (ref 0–0.2)
BASOPHILS NFR BLD AUTO: 0.5 % (ref 0–1.5)
BILIRUB UR QL STRIP: NEGATIVE
CLARITY UR: CLEAR
COLOR UR: YELLOW
DEPRECATED RDW RBC AUTO: 52.8 FL (ref 37–54)
EOSINOPHIL # BLD AUTO: 0.09 10*3/MM3 (ref 0–0.4)
EOSINOPHIL NFR BLD AUTO: 2.1 % (ref 0.3–6.2)
ERYTHROCYTE [DISTWIDTH] IN BLOOD BY AUTOMATED COUNT: 14.1 % (ref 12.3–15.4)
GLUCOSE UR STRIP-MCNC: NEGATIVE MG/DL
HCT VFR BLD AUTO: 36.4 % (ref 34–46.6)
HGB BLD-MCNC: 11.4 G/DL (ref 12–15.9)
HGB UR QL STRIP.AUTO: NEGATIVE
HOLD SPECIMEN: NORMAL
HYALINE CASTS UR QL AUTO: NORMAL /LPF
IMM GRANULOCYTES # BLD AUTO: 0 10*3/MM3 (ref 0–0.05)
IMM GRANULOCYTES NFR BLD AUTO: 0 % (ref 0–0.5)
KETONES UR QL STRIP: NEGATIVE
LEUKOCYTE ESTERASE UR QL STRIP.AUTO: ABNORMAL
LYMPHOCYTES # BLD AUTO: 1.04 10*3/MM3 (ref 0.7–3.1)
LYMPHOCYTES NFR BLD AUTO: 24.7 % (ref 19.6–45.3)
MCH RBC QN AUTO: 31.8 PG (ref 26.6–33)
MCHC RBC AUTO-ENTMCNC: 31.3 G/DL (ref 31.5–35.7)
MCV RBC AUTO: 101.4 FL (ref 79–97)
MONOCYTES # BLD AUTO: 0.91 10*3/MM3 (ref 0.1–0.9)
MONOCYTES NFR BLD AUTO: 21.6 % (ref 5–12)
NEUTROPHILS NFR BLD AUTO: 2.15 10*3/MM3 (ref 1.7–7)
NEUTROPHILS NFR BLD AUTO: 51.1 % (ref 42.7–76)
NITRITE UR QL STRIP: NEGATIVE
NRBC BLD AUTO-RTO: 0 /100 WBC (ref 0–0.2)
PH UR STRIP.AUTO: 7 [PH] (ref 5–8)
PLATELET # BLD AUTO: 328 10*3/MM3 (ref 140–450)
PMV BLD AUTO: 10.7 FL (ref 6–12)
PROT UR QL STRIP: NEGATIVE
RBC # BLD AUTO: 3.59 10*6/MM3 (ref 3.77–5.28)
RBC # UR STRIP: NORMAL /HPF
REF LAB TEST METHOD: NORMAL
SP GR UR STRIP: 1.01 (ref 1–1.03)
SQUAMOUS #/AREA URNS HPF: NORMAL /HPF
UROBILINOGEN UR QL STRIP: ABNORMAL
WBC # UR STRIP: NORMAL /HPF
WBC NRBC COR # BLD AUTO: 4.21 10*3/MM3 (ref 3.4–10.8)
WHOLE BLOOD HOLD COAG: NORMAL

## 2025-08-01 PROCEDURE — 99284 EMERGENCY DEPT VISIT MOD MDM: CPT

## 2025-08-01 PROCEDURE — 36415 COLL VENOUS BLD VENIPUNCTURE: CPT

## 2025-08-01 PROCEDURE — 81001 URINALYSIS AUTO W/SCOPE: CPT | Performed by: NURSE PRACTITIONER

## 2025-08-01 PROCEDURE — 84484 ASSAY OF TROPONIN QUANT: CPT | Performed by: NURSE PRACTITIONER

## 2025-08-01 PROCEDURE — 80053 COMPREHEN METABOLIC PANEL: CPT | Performed by: NURSE PRACTITIONER

## 2025-08-01 PROCEDURE — 85025 COMPLETE CBC W/AUTO DIFF WBC: CPT | Performed by: NURSE PRACTITIONER

## 2025-08-01 PROCEDURE — 83880 ASSAY OF NATRIURETIC PEPTIDE: CPT | Performed by: NURSE PRACTITIONER

## 2025-08-01 PROCEDURE — 71045 X-RAY EXAM CHEST 1 VIEW: CPT

## 2025-08-01 RX ORDER — SODIUM CHLORIDE 0.9 % (FLUSH) 0.9 %
10 SYRINGE (ML) INJECTION AS NEEDED
Status: DISCONTINUED | OUTPATIENT
Start: 2025-08-01 | End: 2025-08-02 | Stop reason: HOSPADM

## 2025-08-02 VITALS
BODY MASS INDEX: 21.97 KG/M2 | SYSTOLIC BLOOD PRESSURE: 132 MMHG | DIASTOLIC BLOOD PRESSURE: 60 MMHG | RESPIRATION RATE: 16 BRPM | OXYGEN SATURATION: 97 % | TEMPERATURE: 98 F | HEIGHT: 63 IN | HEART RATE: 68 BPM

## 2025-08-02 LAB
ALBUMIN SERPL-MCNC: 3.6 G/DL (ref 3.5–5.2)
ALBUMIN/GLOB SERPL: 2.1 G/DL
ALP SERPL-CCNC: 22 U/L (ref 39–117)
ALT SERPL W P-5'-P-CCNC: 12 U/L (ref 1–33)
ANION GAP SERPL CALCULATED.3IONS-SCNC: 10.6 MMOL/L (ref 5–15)
AST SERPL-CCNC: 24 U/L (ref 1–32)
BILIRUB SERPL-MCNC: 0.2 MG/DL (ref 0–1.2)
BUN SERPL-MCNC: 20.2 MG/DL (ref 8–23)
BUN/CREAT SERPL: 15.9 (ref 7–25)
CALCIUM SPEC-SCNC: 8.4 MG/DL (ref 8.6–10.5)
CHLORIDE SERPL-SCNC: 108 MMOL/L (ref 98–107)
CO2 SERPL-SCNC: 21.4 MMOL/L (ref 22–29)
CREAT SERPL-MCNC: 1.27 MG/DL (ref 0.57–1)
EGFRCR SERPLBLD CKD-EPI 2021: 42.3 ML/MIN/1.73
GLOBULIN UR ELPH-MCNC: 1.7 GM/DL
GLUCOSE SERPL-MCNC: 100 MG/DL (ref 65–99)
NT-PROBNP SERPL-MCNC: 690 PG/ML (ref 0–1800)
POTASSIUM SERPL-SCNC: 3.7 MMOL/L (ref 3.5–5.2)
PROT SERPL-MCNC: 5.3 G/DL (ref 6–8.5)
QT INTERVAL: 388 MS
QTC INTERVAL: 406 MS
SODIUM SERPL-SCNC: 140 MMOL/L (ref 136–145)
TROPONIN T SERPL HS-MCNC: 11 NG/L

## 2025-08-02 PROCEDURE — 93005 ELECTROCARDIOGRAM TRACING: CPT | Performed by: NURSE PRACTITIONER

## 2025-08-02 RX ORDER — ACETAMINOPHEN 160 MG/5ML
650 SOLUTION ORAL EVERY 4 HOURS PRN
Status: DISCONTINUED | OUTPATIENT
Start: 2025-08-02 | End: 2025-08-02 | Stop reason: HOSPADM

## 2025-08-02 RX ORDER — ACETAMINOPHEN 325 MG/1
650 TABLET ORAL EVERY 4 HOURS PRN
Status: DISCONTINUED | OUTPATIENT
Start: 2025-08-02 | End: 2025-08-02 | Stop reason: HOSPADM

## 2025-08-02 RX ORDER — SODIUM CHLORIDE 0.9 % (FLUSH) 0.9 %
10 SYRINGE (ML) INJECTION AS NEEDED
Status: DISCONTINUED | OUTPATIENT
Start: 2025-08-02 | End: 2025-08-02

## 2025-08-02 RX ORDER — SODIUM CHLORIDE 0.9 % (FLUSH) 0.9 %
10 SYRINGE (ML) INJECTION EVERY 12 HOURS SCHEDULED
Status: DISCONTINUED | OUTPATIENT
Start: 2025-08-02 | End: 2025-08-02

## 2025-08-02 RX ORDER — ONDANSETRON 2 MG/ML
4 INJECTION INTRAMUSCULAR; INTRAVENOUS EVERY 6 HOURS PRN
Status: DISCONTINUED | OUTPATIENT
Start: 2025-08-02 | End: 2025-08-02

## 2025-08-02 RX ORDER — ACETAMINOPHEN 650 MG/1
650 SUPPOSITORY RECTAL EVERY 4 HOURS PRN
Status: DISCONTINUED | OUTPATIENT
Start: 2025-08-02 | End: 2025-08-02 | Stop reason: HOSPADM

## 2025-08-02 RX ORDER — SODIUM CHLORIDE 9 MG/ML
100 INJECTION, SOLUTION INTRAVENOUS AS NEEDED
Status: DISCONTINUED | OUTPATIENT
Start: 2025-08-02 | End: 2025-08-02

## 2025-08-02 NOTE — DISCHARGE INSTRUCTIONS
Today we found a 4 cm lung nodule on the left upper lung which needs to be evaluated by CT you need to follow-up with Dr. Hoyt as you chose to have an outpatient procedure for this chest CT and have her ordered on an outpatient basis and have it done at your earliest convenience    Return for any increased pain nausea vomiting fever chills or worsening symptoms

## 2025-08-02 NOTE — ED PROVIDER NOTES
Subjective   History of Present Illness  Patient is an 84-year-old female who states she lives at home alone and she has laid down to go to bed tonight she states she was almost asleep when she began having a shaking episode she states that lasted about 30 minutes are son came over and said he witnessed her and said that she was shaking all over kind of inappropriately but was very anxious      Review of Systems   Neurological:  Positive for tremors.       No past medical history on file.    Allergies   Allergen Reactions    Carbamazepine Itching    Divalproex Sodium Hives    Lamotrigine Rash    Penicillins Rash    Sulfa Antibiotics Rash    Phenytoin Sodium Extended Hives       Past Surgical History:   Procedure Laterality Date    HYSTERECTOMY         No family history on file.    Social History     Socioeconomic History    Marital status: Single   Tobacco Use    Smoking status: Never    Smokeless tobacco: Never   Substance and Sexual Activity    Alcohol use: Never    Drug use: Never    Sexual activity: Not Currently     Partners: Male           Objective   Physical Exam  Vitals reviewed.   Constitutional:       Appearance: Normal appearance. She is well-developed and normal weight.   HENT:      Head: Normocephalic and atraumatic.   Eyes:      Conjunctiva/sclera: Conjunctivae normal.   Cardiovascular:      Rate and Rhythm: Normal rate and regular rhythm.      Heart sounds: Normal heart sounds.   Pulmonary:      Effort: Pulmonary effort is normal. No respiratory distress.      Breath sounds: Normal breath sounds. No wheezing.   Abdominal:      General: Bowel sounds are normal.      Palpations: Abdomen is soft.      Tenderness: There is no abdominal tenderness.   Musculoskeletal:         General: No deformity. Normal range of motion.      Cervical back: Normal range of motion and neck supple.   Skin:     General: Skin is warm and dry.      Capillary Refill: Capillary refill takes 2 to 3 seconds.   Neurological:       "General: No focal deficit present.      Mental Status: She is alert and oriented to person, place, and time.      GCS: GCS eye subscore is 4. GCS verbal subscore is 5. GCS motor subscore is 6.      Motor: No weakness.   Psychiatric:         Mood and Affect: Mood normal.         Behavior: Behavior normal.         Thought Content: Thought content normal.         Judgment: Judgment normal.         Procedures           EKG was obtained reviewed and found to be sinus rhythm with a rate of 66 no ectopy no ST elevation reviewed by myself and Dr. Nguyen  ED Course  ED Course as of 08/02/25 0058   Sat Aug 02, 2025   0023 Patient was told of the pulmonary nodule on the chest x-ray and was offered CT she does not wish to do it at this time she wishes to do it on an outpatient basis she states that she has never been a smoker she states that she has 3 dogs at home she needs to get home to [KW]      ED Course User Index  [KW] Lynn Batista, APRN                  /65   Pulse 71   Temp 98.4 °F (36.9 °C) (Oral)   Resp 16   Ht 160 cm (63\")   SpO2 97%   BMI 21.97 kg/m²   Labs Reviewed   COMPREHENSIVE METABOLIC PANEL - Abnormal; Notable for the following components:       Result Value    Glucose 100 (*)     Creatinine 1.27 (*)     Chloride 108 (*)     CO2 21.4 (*)     Calcium 8.4 (*)     Total Protein 5.3 (*)     Alkaline Phosphatase 22 (*)     eGFR 42.3 (*)     All other components within normal limits    Narrative:     GFR Categories in Chronic Kidney Disease (CKD)              GFR Category          GFR (mL/min/1.73)    Interpretation  G1                    90 or greater        Normal or high (1)  G2                    60-89                Mild decrease (1)  G3a                   45-59                Mild to moderate decrease  G3b                   30-44                Moderate to severe decrease  G4                    15-29                Severe decrease  G5                    14 or less           Kidney " failure    (1)In the absence of evidence of kidney disease, neither GFR category G1 or G2 fulfill the criteria for CKD.    eGFR calculation 2021 CKD-EPI creatinine equation, which does not include race as a factor   CBC WITH AUTO DIFFERENTIAL - Abnormal; Notable for the following components:    RBC 3.59 (*)     Hemoglobin 11.4 (*)     .4 (*)     MCHC 31.3 (*)     Monocyte % 21.6 (*)     Monocytes, Absolute 0.91 (*)     All other components within normal limits   URINALYSIS W/ CULTURE IF INDICATED - Abnormal; Notable for the following components:    Leuk Esterase, UA Small (1+) (*)     All other components within normal limits    Narrative:     In absence of clinical symptoms, the presence of pyuria, bacteria, and/or nitrites on the urinalysis result does not correlate with infection.   BNP (IN-HOUSE) - Normal    Narrative:     This assay is used as an aid in the diagnosis of individuals suspected of having heart failure. It can be used as an aid in the diagnosis of acute decompensated heart failure (ADHF) in patients presenting with signs and symptoms of ADHF to the emergency department (ED). In addition, NT-proBNP of <300 pg/mL indicates ADHF is not likely.    Age Range Result Interpretation  NT-proBNP Concentration (pg/mL:      <50             Positive            >450                   Gray                 300-450                    Negative             <300    50-75           Positive            >900                  Gray                300-900                  Negative            <300      >75             Positive            >1800                  Gray                300-1800                  Negative            <300   TROPONIN - Normal    Narrative:     High Sensitive Troponin T Reference Range:  <14.0 ng/L- Negative Female for AMI  <22.0 ng/L- Negative Male for AMI  >=14 - Abnormal Female indicating possible myocardial injury.  >=22 - Abnormal Male indicating possible myocardial injury.   Clinicians  would have to utilize clinical acumen, EKG, Troponin, and serial changes to determine if it is an Acute Myocardial Infarction or myocardial injury due to an underlying chronic condition.        URINALYSIS, MICROSCOPIC ONLY   CBC AND DIFFERENTIAL    Narrative:     The following orders were created for panel order CBC & Differential.  Procedure                               Abnormality         Status                     ---------                               -----------         ------                     CBC Auto Differential[[id number]]        Abnormal            Final result               Scan Slide[[id number]]                                                                    Please view results for these tests on the individual orders.   EXTRA TUBES    Narrative:     The following orders were created for panel order Extra Tubes.  Procedure                               Abnormality         Status                     ---------                               -----------         ------                     Gold Top - SST[[id number]]                                   Final result               Light Blue Top[[id number]]                                   Final result                 Please view results for these tests on the individual orders.   GOLD TOP - SST   LIGHT BLUE TOP     Medications   sodium chloride 0.9 % flush 10 mL (has no administration in time range)   acetaminophen (TYLENOL) tablet 650 mg (has no administration in time range)     Or   acetaminophen (TYLENOL) 160 MG/5ML oral solution 650 mg (has no administration in time range)     Or   acetaminophen (TYLENOL) suppository 650 mg (has no administration in time range)     XR Chest 1 View  Result Date: 8/2/2025  Impression: 1.4 cm left upper lobe pulmonary nodule. CT scan of the chest is recommended for characterization. Electronically Signed: Eder Rodríguez MD  8/2/2025 12:04 AM EDT  Workstation ID: [id number]                                         Medical Decision  Making  Patient is an 84-year-old female who had a shaking episode at home who comes in after having a shaking episode at home.  She has no chest pain no shortness of breath she is somewhat anxious to get back home because she states she has 3 small dogs at home she had IV established and blood work was obtained and reviewed she had a normal CBC chemistry and troponin EKG  were obtained and reviewed and found to be within normal limits the patient had no complaints and she was told of a lung nodule in the left upper lobe that was identified on the chest x-ray she was offered a CT tonight as that was what was recommended by the radiologist but she does not wish to do it tonight as she wishes to get home to the small dogs and her son at bedside is waiting for her to go home we talked about her following up with primary care and getting an outpatient CT she was agreeable to this plan of care and it was fully described for her on the discharge instruction    Amount and/or Complexity of Data Reviewed  Labs: ordered. Decision-making details documented in ED Course.  Radiology: ordered and independent interpretation performed. Decision-making details documented in ED Course.  ECG/medicine tests: ordered and independent interpretation performed. Decision-making details documented in ED Course.    Risk  OTC drugs.  Prescription drug management.        Final diagnoses:   Nodule of apex of left lung       ED Disposition  ED Disposition       ED Disposition   Discharge    Condition   Stable    Comment   --               Shari Hoyt, APRN  [address] [place] [address]  [phone]      ASAP         Medication List      No changes were made to your prescriptions during this visit.            Lynn Batista, APRN  08/02/25 0058